# Patient Record
Sex: MALE | NOT HISPANIC OR LATINO
[De-identification: names, ages, dates, MRNs, and addresses within clinical notes are randomized per-mention and may not be internally consistent; named-entity substitution may affect disease eponyms.]

---

## 2017-09-14 ENCOUNTER — APPOINTMENT (OUTPATIENT)
Age: 82
End: 2017-09-14

## 2017-09-14 PROBLEM — Z00.00 ENCOUNTER FOR PREVENTIVE HEALTH EXAMINATION: Status: ACTIVE | Noted: 2017-09-14

## 2017-09-28 ENCOUNTER — APPOINTMENT (OUTPATIENT)
Dept: HEART AND VASCULAR | Facility: CLINIC | Age: 82
End: 2017-09-28
Payer: MEDICARE

## 2017-09-28 VITALS
HEART RATE: 65 BPM | BODY MASS INDEX: 19.91 KG/M2 | HEIGHT: 72 IN | WEIGHT: 147 LBS | SYSTOLIC BLOOD PRESSURE: 140 MMHG | DIASTOLIC BLOOD PRESSURE: 63 MMHG

## 2017-09-28 DIAGNOSIS — I10 ESSENTIAL (PRIMARY) HYPERTENSION: ICD-10-CM

## 2017-09-28 DIAGNOSIS — E11.9 TYPE 2 DIABETES MELLITUS W/OUT COMPLICATIONS: ICD-10-CM

## 2017-09-28 DIAGNOSIS — I44.7 LEFT BUNDLE-BRANCH BLOCK, UNSPECIFIED: ICD-10-CM

## 2017-09-28 DIAGNOSIS — Z87.898 PERSONAL HISTORY OF OTHER SPECIFIED CONDITIONS: ICD-10-CM

## 2017-09-28 DIAGNOSIS — I25.5 ISCHEMIC CARDIOMYOPATHY: ICD-10-CM

## 2017-09-28 DIAGNOSIS — Z87.891 PERSONAL HISTORY OF NICOTINE DEPENDENCE: ICD-10-CM

## 2017-09-28 PROCEDURE — 93000 ELECTROCARDIOGRAM COMPLETE: CPT

## 2017-09-28 PROCEDURE — 99205 OFFICE O/P NEW HI 60 MIN: CPT

## 2017-09-28 RX ORDER — LATANOPROST/PF 0.005 %
0.01 DROPS OPHTHALMIC (EYE)
Refills: 0 | Status: ACTIVE | COMMUNITY
Start: 2017-09-28

## 2017-09-28 RX ORDER — SIMVASTATIN 20 MG/1
20 TABLET, FILM COATED ORAL
Refills: 0 | Status: ACTIVE | COMMUNITY
Start: 2017-09-28

## 2017-09-28 RX ORDER — KETOROLAC TROMETHAMINE 5 MG/ML
0.5 SOLUTION OPHTHALMIC
Refills: 0 | Status: ACTIVE | COMMUNITY
Start: 2017-09-28

## 2017-09-28 RX ORDER — BRIMONIDINE TARTRATE 2 MG/MG
0.2 SOLUTION/ DROPS OPHTHALMIC
Refills: 0 | Status: ACTIVE | COMMUNITY
Start: 2017-09-28

## 2017-09-28 RX ORDER — LISINOPRIL 5 MG/1
5 TABLET ORAL
Refills: 0 | Status: ACTIVE | COMMUNITY
Start: 2017-09-28

## 2017-09-28 RX ORDER — CARVEDILOL 6.25 MG/1
6.25 TABLET, FILM COATED ORAL
Refills: 0 | Status: ACTIVE | COMMUNITY
Start: 2017-09-28

## 2017-10-02 PROBLEM — I10 HTN (HYPERTENSION), BENIGN: Status: ACTIVE | Noted: 2017-10-02

## 2017-10-02 PROBLEM — E11.9 DIABETES MELLITUS: Status: ACTIVE | Noted: 2017-10-02

## 2017-10-02 PROBLEM — I44.7 LBBB (LEFT BUNDLE BRANCH BLOCK): Status: ACTIVE | Noted: 2017-10-02

## 2017-10-02 PROBLEM — I25.5 ISCHEMIC CARDIOMYOPATHY: Status: ACTIVE | Noted: 2017-10-02

## 2017-10-12 ENCOUNTER — APPOINTMENT (OUTPATIENT)
Dept: HEART AND VASCULAR | Facility: CLINIC | Age: 82
End: 2017-10-12
Payer: MEDICARE

## 2017-10-12 VITALS
BODY MASS INDEX: 18.96 KG/M2 | HEART RATE: 71 BPM | SYSTOLIC BLOOD PRESSURE: 137 MMHG | WEIGHT: 140 LBS | DIASTOLIC BLOOD PRESSURE: 65 MMHG | HEIGHT: 72 IN

## 2017-10-12 PROCEDURE — 99215 OFFICE O/P EST HI 40 MIN: CPT

## 2017-10-12 PROCEDURE — 93000 ELECTROCARDIOGRAM COMPLETE: CPT

## 2017-10-30 ENCOUNTER — INPATIENT (INPATIENT)
Facility: HOSPITAL | Age: 82
LOS: 0 days | Discharge: HOME | DRG: 223 | End: 2017-10-31
Attending: INTERNAL MEDICINE | Admitting: INTERNAL MEDICINE
Payer: MEDICARE

## 2017-10-30 VITALS
HEART RATE: 60 BPM | DIASTOLIC BLOOD PRESSURE: 86 MMHG | RESPIRATION RATE: 14 BRPM | SYSTOLIC BLOOD PRESSURE: 179 MMHG | TEMPERATURE: 98 F | OXYGEN SATURATION: 99 %

## 2017-10-30 DIAGNOSIS — Z98.890 OTHER SPECIFIED POSTPROCEDURAL STATES: Chronic | ICD-10-CM

## 2017-10-30 PROCEDURE — 33249 INSJ/RPLCMT DEFIB W/LEAD(S): CPT | Mod: 59,Q0

## 2017-10-30 PROCEDURE — 93010 ELECTROCARDIOGRAM REPORT: CPT

## 2017-10-30 PROCEDURE — 93641 EP EVL 1/2CHMB PAC CVDFB TST: CPT | Mod: 26

## 2017-10-30 PROCEDURE — 33225 L VENTRIC PACING LEAD ADD-ON: CPT

## 2017-10-30 RX ORDER — ASPIRIN/CALCIUM CARB/MAGNESIUM 324 MG
1 TABLET ORAL
Qty: 0 | Refills: 0 | COMMUNITY

## 2017-10-30 RX ORDER — DEXTROSE 50 % IN WATER 50 %
1 SYRINGE (ML) INTRAVENOUS ONCE
Qty: 0 | Refills: 0 | Status: DISCONTINUED | OUTPATIENT
Start: 2017-10-30 | End: 2017-10-31

## 2017-10-30 RX ORDER — OXYCODONE AND ACETAMINOPHEN 5; 325 MG/1; MG/1
1 TABLET ORAL EVERY 6 HOURS
Qty: 0 | Refills: 0 | Status: DISCONTINUED | OUTPATIENT
Start: 2017-10-30 | End: 2017-10-31

## 2017-10-30 RX ORDER — SIMVASTATIN 20 MG/1
20 TABLET, FILM COATED ORAL AT BEDTIME
Qty: 0 | Refills: 0 | Status: DISCONTINUED | OUTPATIENT
Start: 2017-10-30 | End: 2017-10-31

## 2017-10-30 RX ORDER — LATANOPROST 0.05 MG/ML
1 SOLUTION/ DROPS OPHTHALMIC; TOPICAL
Qty: 0 | Refills: 0 | COMMUNITY

## 2017-10-30 RX ORDER — LISINOPRIL 2.5 MG/1
5 TABLET ORAL DAILY
Qty: 0 | Refills: 0 | Status: DISCONTINUED | OUTPATIENT
Start: 2017-10-30 | End: 2017-10-31

## 2017-10-30 RX ORDER — SODIUM CHLORIDE 9 MG/ML
1000 INJECTION, SOLUTION INTRAVENOUS
Qty: 0 | Refills: 0 | Status: DISCONTINUED | OUTPATIENT
Start: 2017-10-30 | End: 2017-10-31

## 2017-10-30 RX ORDER — CEFAZOLIN SODIUM 1 G
1000 VIAL (EA) INJECTION ONCE
Qty: 0 | Refills: 0 | Status: COMPLETED | OUTPATIENT
Start: 2017-10-30 | End: 2017-10-30

## 2017-10-30 RX ORDER — BRIMONIDINE TARTRATE 2 MG/MG
1 SOLUTION/ DROPS OPHTHALMIC
Qty: 0 | Refills: 0 | COMMUNITY

## 2017-10-30 RX ORDER — ACETAMINOPHEN 500 MG
650 TABLET ORAL EVERY 4 HOURS
Qty: 0 | Refills: 0 | Status: DISCONTINUED | OUTPATIENT
Start: 2017-10-30 | End: 2017-10-31

## 2017-10-30 RX ORDER — BRIMONIDINE TARTRATE 2 MG/MG
1 SOLUTION/ DROPS OPHTHALMIC THREE TIMES A DAY
Qty: 0 | Refills: 0 | Status: DISCONTINUED | OUTPATIENT
Start: 2017-10-30 | End: 2017-10-31

## 2017-10-30 RX ORDER — CEFAZOLIN SODIUM 1 G
1000 VIAL (EA) INJECTION EVERY 8 HOURS
Qty: 0 | Refills: 0 | Status: COMPLETED | OUTPATIENT
Start: 2017-10-30 | End: 2017-10-31

## 2017-10-30 RX ORDER — SIMVASTATIN 20 MG/1
1 TABLET, FILM COATED ORAL
Qty: 0 | Refills: 0 | COMMUNITY

## 2017-10-30 RX ORDER — INSULIN LISPRO 100/ML
VIAL (ML) SUBCUTANEOUS
Qty: 0 | Refills: 0 | Status: DISCONTINUED | OUTPATIENT
Start: 2017-10-30 | End: 2017-10-31

## 2017-10-30 RX ORDER — GLUCAGON INJECTION, SOLUTION 0.5 MG/.1ML
1 INJECTION, SOLUTION SUBCUTANEOUS ONCE
Qty: 0 | Refills: 0 | Status: DISCONTINUED | OUTPATIENT
Start: 2017-10-30 | End: 2017-10-31

## 2017-10-30 RX ORDER — CARVEDILOL PHOSPHATE 80 MG/1
6.25 CAPSULE, EXTENDED RELEASE ORAL
Qty: 0 | Refills: 0 | Status: DISCONTINUED | OUTPATIENT
Start: 2017-10-30 | End: 2017-10-31

## 2017-10-30 RX ORDER — INFLUENZA VIRUS VACCINE 15; 15; 15; 15 UG/.5ML; UG/.5ML; UG/.5ML; UG/.5ML
0.5 SUSPENSION INTRAMUSCULAR ONCE
Qty: 0 | Refills: 0 | Status: COMPLETED | OUTPATIENT
Start: 2017-10-30 | End: 2017-10-30

## 2017-10-30 RX ORDER — LATANOPROST 0.05 MG/ML
1 SOLUTION/ DROPS OPHTHALMIC; TOPICAL AT BEDTIME
Qty: 0 | Refills: 0 | Status: DISCONTINUED | OUTPATIENT
Start: 2017-10-30 | End: 2017-10-31

## 2017-10-30 RX ORDER — ASPIRIN/CALCIUM CARB/MAGNESIUM 324 MG
81 TABLET ORAL DAILY
Qty: 0 | Refills: 0 | Status: DISCONTINUED | OUTPATIENT
Start: 2017-10-31 | End: 2017-10-31

## 2017-10-30 RX ADMIN — LATANOPROST 1 DROP(S): 0.05 SOLUTION/ DROPS OPHTHALMIC; TOPICAL at 21:53

## 2017-10-30 RX ADMIN — CARVEDILOL PHOSPHATE 6.25 MILLIGRAM(S): 80 CAPSULE, EXTENDED RELEASE ORAL at 17:23

## 2017-10-30 RX ADMIN — BRIMONIDINE TARTRATE 1 DROP(S): 2 SOLUTION/ DROPS OPHTHALMIC at 17:23

## 2017-10-30 RX ADMIN — Medication 100 MILLIGRAM(S): at 17:23

## 2017-10-30 RX ADMIN — Medication 100 MILLIGRAM(S): at 08:50

## 2017-10-30 RX ADMIN — SIMVASTATIN 20 MILLIGRAM(S): 20 TABLET, FILM COATED ORAL at 21:52

## 2017-10-30 RX ADMIN — Medication 1: at 22:08

## 2017-10-30 RX ADMIN — Medication 650 MILLIGRAM(S): at 19:48

## 2017-10-30 RX ADMIN — BRIMONIDINE TARTRATE 1 DROP(S): 2 SOLUTION/ DROPS OPHTHALMIC at 21:53

## 2017-10-30 RX ADMIN — LISINOPRIL 5 MILLIGRAM(S): 2.5 TABLET ORAL at 23:01

## 2017-10-30 NOTE — H&P ADULT - HISTORY OF PRESENT ILLNESS
81 y/o M with h/o borderline DM (diet control), HTN, non-ischemic cardiomyopathy, CHF NYHA class II with LVEF 25-30%, LBBB ( ms), here for BiV-ICD implant.   He states that in 2010 he had an episode of chest pain followed by syncope. He underwent cardiac cath at [place] with non-obstructive CAD.  No stents were required. He has had a persistently low EF despite being compliant with meds.  Overall he states that he is very active.    Stress test 8/2016: anterior apical scar with mild diana-infarct ishcemia.  Scar in mid apical inferior wall.   Echo 8/2016: EF 25-30%, mild MR and mild-mod TR 83 y/o M with h/o borderline DM (diet control), HTN, "heart attack" in 2010, ischemic cardiomyopathy, CHF NYHA class II with LVEF 25-30%, LBBB ( ms), here for BiV-ICD implant.   He states that in 2010 he had an episode of chest pain followed by syncope. He was told at that time that he had a heart attack and underwent cardiac cath at [place] with non-obstructive CAD.  No stents were required. He has had a persistently low EF despite being compliant with meds.  Overall he states that he is very active.    Stress test 8/2016: anterior apical scar with mild diana-infarct ischemia.  Scar in mid apical inferior wall.   Echo 8/2016: EF 25-30%, mild MR and mild-mod TR

## 2017-10-30 NOTE — PROGRESS NOTE ADULT - SUBJECTIVE AND OBJECTIVE BOX
Brief procedure note, full note to follow.  this is an 82 year old man with a history of class II NYHA heart failure. He was admitted to [facility] in the past with an acute myocardial infarction. He underwent angiography demonstrating nonocclusive disease, suggesting spontaneous revascularization.. He has been on optimal medical therapy for over one year now and has a persistently documented EF of 30%. He has a left bundle branch block pattern on his ECG and is a candidate for resynchronization therapy. given his age, chronic systolic dysfunction, and history of heart failure he will be admitted. He is to be implanted for primary prophylaxis.    Uncomplicated biventricular Biotronik ICD implanted via three left axillary venous punctures (second rib technique under fluoroscopic guidance).  Excellent pacing and sensing, tolerated without complication.    Plan;  ancef every 8 hours for two more doses.  Routine chest x-ray and ecg.

## 2017-10-30 NOTE — H&P ADULT - ASSESSMENT
81 y/o M with h/o borderline DM (diet control), HTN, non-ischemic cardiomyopathy, CHF NYHA class II with LVEF 25-30%, LBBB ( ms), here for BiV-ICD implant.   - NPO for Biv-ICD today.   - continue heart failure meds  - Hypertensive on admission (179/87) despite home meds. Will continue to monitor post procedure and titrate home meds as neccessary. 81 y/o M with h/o borderline DM (diet control), HTN, ischemic cardiomyopathy, CHF NYHA class II with LVEF 25-30%, LBBB ( ms), here for BiV-ICD implant.   - NPO for Biv-ICD today.   - continue heart failure meds  - Hypertensive on admission (179/87) despite home meds. Will continue to monitor post procedure and titrate home meds as neccessary.

## 2017-10-31 ENCOUNTER — TRANSCRIPTION ENCOUNTER (OUTPATIENT)
Age: 82
End: 2017-10-31

## 2017-10-31 VITALS — OXYGEN SATURATION: 98 % | DIASTOLIC BLOOD PRESSURE: 79 MMHG | SYSTOLIC BLOOD PRESSURE: 148 MMHG

## 2017-10-31 PROBLEM — I42.8 OTHER CARDIOMYOPATHIES: Chronic | Status: ACTIVE | Noted: 2017-10-30

## 2017-10-31 PROBLEM — I44.7 LEFT BUNDLE-BRANCH BLOCK, UNSPECIFIED: Chronic | Status: ACTIVE | Noted: 2017-10-30

## 2017-10-31 PROBLEM — I10 ESSENTIAL (PRIMARY) HYPERTENSION: Chronic | Status: ACTIVE | Noted: 2017-10-30

## 2017-10-31 PROCEDURE — 71020: CPT | Mod: 26

## 2017-10-31 RX ORDER — CARVEDILOL PHOSPHATE 80 MG/1
1 CAPSULE, EXTENDED RELEASE ORAL
Qty: 0 | Refills: 0 | COMMUNITY

## 2017-10-31 RX ORDER — CARVEDILOL PHOSPHATE 80 MG/1
1 CAPSULE, EXTENDED RELEASE ORAL
Qty: 60 | Refills: 3 | OUTPATIENT
Start: 2017-10-31 | End: 2018-02-27

## 2017-10-31 RX ORDER — LISINOPRIL 2.5 MG/1
5 TABLET ORAL ONCE
Qty: 0 | Refills: 0 | Status: COMPLETED | OUTPATIENT
Start: 2017-10-31 | End: 2017-10-31

## 2017-10-31 RX ORDER — LISINOPRIL 2.5 MG/1
1 TABLET ORAL
Qty: 30 | Refills: 3 | OUTPATIENT
Start: 2017-10-31 | End: 2018-02-27

## 2017-10-31 RX ORDER — LISINOPRIL 2.5 MG/1
1 TABLET ORAL
Qty: 0 | Refills: 0 | COMMUNITY

## 2017-10-31 RX ORDER — CARVEDILOL PHOSPHATE 80 MG/1
12.5 CAPSULE, EXTENDED RELEASE ORAL EVERY 12 HOURS
Qty: 0 | Refills: 0 | Status: DISCONTINUED | OUTPATIENT
Start: 2017-10-31 | End: 2017-10-31

## 2017-10-31 RX ORDER — LISINOPRIL 2.5 MG/1
10 TABLET ORAL DAILY
Qty: 0 | Refills: 0 | Status: DISCONTINUED | OUTPATIENT
Start: 2017-11-01 | End: 2017-10-31

## 2017-10-31 RX ADMIN — LISINOPRIL 5 MILLIGRAM(S): 2.5 TABLET ORAL at 05:06

## 2017-10-31 RX ADMIN — CARVEDILOL PHOSPHATE 12.5 MILLIGRAM(S): 80 CAPSULE, EXTENDED RELEASE ORAL at 10:51

## 2017-10-31 RX ADMIN — Medication 650 MILLIGRAM(S): at 03:30

## 2017-10-31 RX ADMIN — Medication 81 MILLIGRAM(S): at 10:53

## 2017-10-31 RX ADMIN — CARVEDILOL PHOSPHATE 6.25 MILLIGRAM(S): 80 CAPSULE, EXTENDED RELEASE ORAL at 05:06

## 2017-10-31 RX ADMIN — Medication 650 MILLIGRAM(S): at 02:37

## 2017-10-31 RX ADMIN — BRIMONIDINE TARTRATE 1 DROP(S): 2 SOLUTION/ DROPS OPHTHALMIC at 05:48

## 2017-10-31 RX ADMIN — Medication 100 MILLIGRAM(S): at 00:36

## 2017-10-31 RX ADMIN — LISINOPRIL 5 MILLIGRAM(S): 2.5 TABLET ORAL at 06:45

## 2017-10-31 NOTE — DISCHARGE NOTE ADULT - MEDICATION SUMMARY - MEDICATIONS TO TAKE
I will START or STAY ON the medications listed below when I get home from the hospital:    Aspirin Enteric Coated 81 mg oral delayed release tablet  -- 1 tab(s) by mouth once a day  -- Indication: For Cardiomyopathy, nonischemic    lisinopril 10 mg oral tablet  -- 1 tab(s) by mouth once a day  -- Indication: For Cardiomyopathy, nonischemic    simvastatin 20 mg oral tablet  -- 1 tab(s) by mouth once a day (at bedtime)  -- Indication: For Cardiomyopathy, nonischemic    carvedilol 12.5 mg oral tablet  -- 1 tab(s) by mouth every 12 hours  -- Indication: For Cardiomyopathy, nonischemic    brimonidine 0.2% ophthalmic solution  -- 1 drop(s) to each affected eye 3 times a day  -- Indication: For eyes    latanoprost 0.005% ophthalmic solution  -- 1 drop(s) to each affected eye once a day (in the evening)  -- Indication: For eyes

## 2017-10-31 NOTE — DISCHARGE NOTE ADULT - PLAN OF CARE
s/p BiV ICD no heavy lifting with L arm or lifting L arm above your shoulder until you are seen in clinic.  Please do not pick at the glue over procedure site - you can shower 11/1 pm or 11/2 am and just let water hit the incision / pat dry.  Any bleeding, discharge, redness or issues at procedure site or fever/chills please call [phone]

## 2017-10-31 NOTE — DISCHARGE NOTE ADULT - MEDICATION SUMMARY - MEDICATIONS TO CHANGE
I will SWITCH the dose or number of times a day I take the medications listed below when I get home from the hospital:    carvedilol 6.25 mg oral tablet  -- 1 tab(s) by mouth 2 times a day    lisinopril 5 mg oral tablet  -- 1 tab(s) by mouth once a day

## 2017-10-31 NOTE — DISCHARGE NOTE ADULT - HOSPITAL COURSE
83 y/o M with h/o borderline DM (diet control), HTN, non-ischemic cardiomyopathy, CHF NYHA class II with LVEF 25-30%, LBBB ( ms), here for BiV-ICD implant.     On 10/30 he underwent an elective BiV ICD placement (Candescent SoftBaseronik) without complications.  He did well overnight.  Ambulating without difficulty.  Chest xray done and no PNX / good lead position.  Telem with AsVP - 3 beats NSVT.  Patient hypertensive and coreg / lisinopril increased.  Device interrogation reveals normal function (see below).  Patient given instructions and has no further questions or concerns.  Follow up made    [id number] Ilivia   DDD50/130  patient is not dependant  RA sense 3.5mV, threshold 0.6V@0.4ms, impedance 505  RV sense24.2mV, threshold 0.4V@0.4ms, impedance 665  LV sense24.2mV, threshold 0.4V@0.4ms, impedance 549  Shock impedance - 63

## 2017-10-31 NOTE — DISCHARGE NOTE ADULT - CARE PROVIDER_API CALL
Josemanuel Wang), Cardiac Electrophysiology; Cardiovascular Disease; Internal Medicine  [address] [place] [address]  Phone: [phone]  Fax: [phone]

## 2017-10-31 NOTE — DISCHARGE NOTE ADULT - CARE PLAN
Principal Discharge DX:	Cardiomyopathy, nonischemic  Goal:	s/p BiV ICD  Instructions for follow-up, activity and diet:	no heavy lifting with L arm or lifting L arm above your shoulder until you are seen in clinic.  Please do not pick at the glue over procedure site - you can shower 11/1 pm or 11/2 am and just let water hit the incision / pat dry.  Any bleeding, discharge, redness or issues at procedure site or fever/chills please call [phone]

## 2017-10-31 NOTE — DISCHARGE NOTE ADULT - PATIENT PORTAL LINK FT
“You can access the [facility] Patient Portal, offered by [facility], by registering with the following website: [email or web address]”

## 2017-11-13 DIAGNOSIS — R73.03 PREDIABETES: ICD-10-CM

## 2017-11-13 DIAGNOSIS — I25.10 ATHEROSCLEROTIC HEART DISEASE OF NATIVE CORONARY ARTERY WITHOUT ANGINA PECTORIS: ICD-10-CM

## 2017-11-13 DIAGNOSIS — I50.22 CHRONIC SYSTOLIC (CONGESTIVE) HEART FAILURE: ICD-10-CM

## 2017-11-13 DIAGNOSIS — I44.7 LEFT BUNDLE-BRANCH BLOCK, UNSPECIFIED: ICD-10-CM

## 2017-11-13 DIAGNOSIS — I25.2 OLD MYOCARDIAL INFARCTION: ICD-10-CM

## 2017-11-13 DIAGNOSIS — I11.0 HYPERTENSIVE HEART DISEASE WITH HEART FAILURE: ICD-10-CM

## 2017-11-13 DIAGNOSIS — I42.8 OTHER CARDIOMYOPATHIES: ICD-10-CM

## 2017-11-13 DIAGNOSIS — Z79.82 LONG TERM (CURRENT) USE OF ASPIRIN: ICD-10-CM

## 2017-11-13 DIAGNOSIS — Z87.891 PERSONAL HISTORY OF NICOTINE DEPENDENCE: ICD-10-CM

## 2017-11-20 PROCEDURE — 93005 ELECTROCARDIOGRAM TRACING: CPT

## 2017-11-20 PROCEDURE — C1900: CPT

## 2017-11-20 PROCEDURE — C1898: CPT

## 2017-11-20 PROCEDURE — C1882: CPT

## 2017-11-20 PROCEDURE — 82962 GLUCOSE BLOOD TEST: CPT

## 2017-11-20 PROCEDURE — 71046 X-RAY EXAM CHEST 2 VIEWS: CPT

## 2017-11-20 PROCEDURE — C1730: CPT

## 2017-11-20 PROCEDURE — C1892: CPT

## 2017-11-20 PROCEDURE — C1777: CPT

## 2017-11-20 PROCEDURE — C1894: CPT

## 2017-11-30 ENCOUNTER — APPOINTMENT (OUTPATIENT)
Dept: HEART AND VASCULAR | Facility: CLINIC | Age: 82
End: 2017-11-30
Payer: MEDICARE

## 2017-11-30 VITALS
SYSTOLIC BLOOD PRESSURE: 137 MMHG | DIASTOLIC BLOOD PRESSURE: 64 MMHG | WEIGHT: 140 LBS | BODY MASS INDEX: 18.96 KG/M2 | HEART RATE: 70 BPM | HEIGHT: 72 IN

## 2017-11-30 PROCEDURE — 93284 PRGRMG EVAL IMPLANTABLE DFB: CPT

## 2021-10-06 NOTE — PATIENT PROFILE ADULT. - BRADEN SCORE (IF 18 OR LESS ACTIVATE SKIN INJURY RISK INCREASED GUIDELINE), MLM
-- DO NOT REPLY / DO NOT REPLY ALL --  -- Message is from the [facility]--    General Patient Message      Reason for Call: :Noah Daugherty is calling to schedule a new patient appointment with Dr Jyothsna Palla. Please call to schedule.    Caller Information       Type Contact Phone    10/04/2021 02:39 PM CDT Phone (Incoming) Dat Noah (Self) [phone] (U)          Alternative phone number: none    Turnaround time given to caller:   \"This message will be sent to [state Provider's name]. The clinical team will fulfill your request as soon as they review your message.\"    
Spoke with pt, he already has an appt with Dr. Ashraf and did not want to change  
19
